# Patient Record
Sex: FEMALE | Race: OTHER | NOT HISPANIC OR LATINO | URBAN - METROPOLITAN AREA
[De-identification: names, ages, dates, MRNs, and addresses within clinical notes are randomized per-mention and may not be internally consistent; named-entity substitution may affect disease eponyms.]

---

## 2019-04-10 ENCOUNTER — EMERGENCY (EMERGENCY)
Facility: HOSPITAL | Age: 19
LOS: 1 days | Discharge: HOME | End: 2019-04-10
Attending: EMERGENCY MEDICINE
Payer: MEDICAID

## 2019-04-10 VITALS
RESPIRATION RATE: 16 BRPM | SYSTOLIC BLOOD PRESSURE: 115 MMHG | HEART RATE: 89 BPM | HEIGHT: 65.75 IN | TEMPERATURE: 97 F | WEIGHT: 119.05 LBS | DIASTOLIC BLOOD PRESSURE: 75 MMHG | OXYGEN SATURATION: 100 %

## 2019-04-10 PROCEDURE — 73140 X-RAY EXAM OF FINGER(S): CPT | Mod: 26,RT

## 2019-04-10 PROCEDURE — 99283 EMERGENCY DEPT VISIT LOW MDM: CPT | Mod: 25

## 2019-04-10 PROCEDURE — 99284 EMERGENCY DEPT VISIT MOD MDM: CPT

## 2019-04-10 PROCEDURE — 73140 X-RAY EXAM OF FINGER(S): CPT

## 2019-04-10 RX ORDER — CEPHALEXIN 500 MG
1 CAPSULE ORAL
Qty: 10 | Refills: 0 | OUTPATIENT
Start: 2019-04-10 | End: 2019-04-14

## 2019-04-10 NOTE — ED PROVIDER NOTE - MUSCULOSKELETAL, MLM
Minimal break to medial aspect of right 1st thumbnail, full ROM. Minimal break to medial aspect of right 1st thumbnail, full ROM, NV intact.

## 2019-04-10 NOTE — ED PROVIDER NOTE - CLINICAL SUMMARY MEDICAL DECISION MAKING FREE TEXT BOX
20 y/o female presents with mild paronychia to right thumb. No drainage. Will prescribe keflex for 5 days and discharge with return precautions and PMD follow up.

## 2019-04-10 NOTE — ED PROVIDER NOTE - OBJECTIVE STATEMENT
20 y/o female with no significant PMHx presents to the ED with c/o pain to the right distal thumb s/p bowling injury 2 days ago. Mild redness and swelling near the fingernail. Pt denies any fever, chills, discharge, other injuries, or other complaints. No tobacco/EtOH/drug use.

## 2019-04-10 NOTE — ED PROVIDER NOTE - SKIN, MLM
Skin normal color for race, warm, dry and intact. Mild erythema and swelling to eponychium of right 1st finger, no fluctuance, no drainage, nail otherwise intact.

## 2019-04-10 NOTE — ED ADULT NURSE NOTE - CAS ELECT INFOMATION PROVIDED
DC instructions/Pt seen, treated and released in intake. See stat docs. No nursing intervention required.